# Patient Record
Sex: FEMALE | Race: WHITE
[De-identification: names, ages, dates, MRNs, and addresses within clinical notes are randomized per-mention and may not be internally consistent; named-entity substitution may affect disease eponyms.]

---

## 2018-03-13 ENCOUNTER — HOSPITAL ENCOUNTER (OUTPATIENT)
Dept: HOSPITAL 5 - US | Age: 43
Discharge: HOME | End: 2018-03-13
Attending: FAMILY MEDICINE
Payer: COMMERCIAL

## 2018-03-13 DIAGNOSIS — R10.2: ICD-10-CM

## 2018-03-13 DIAGNOSIS — N88.8: ICD-10-CM

## 2018-03-13 DIAGNOSIS — Z12.31: Primary | ICD-10-CM

## 2018-03-13 PROCEDURE — 77067 SCR MAMMO BI INCL CAD: CPT

## 2018-03-13 PROCEDURE — 76830 TRANSVAGINAL US NON-OB: CPT

## 2018-03-13 PROCEDURE — 93975 VASCULAR STUDY: CPT

## 2018-03-13 NOTE — ULTRASOUND REPORT
TRANSABDOMINAL AND TRANSVAGINAL PELVIC ULTRASOUND: 03/13/18 10:57:00



CLINICAL: Pelvic pain for one year.







FINDINGS: Transabdominal and transvaginal pelvic ultrasound 

demonstrated a retroverted uterus measuring 11.5 x 3.2 x 5.0 cm.  The 

uterus is stretched and compressed by a very full urinary bladder, the 

measurement.  Normal uterine echogenicity with no uterine fibroid or 

mass identified.The endometrium is normal and measures 7.6 mm AP 

thickness.



A complex cyst of the right ovary measures 1.5 cm.  The right ovary 

measures 3.1 x 4.2 x 2.4 cm.  Multiple small follicles of the right 

ovary.  Normal left ovary with small follicles.  The left ovary 

measures 2.8 x 1.6 x 2.7 cm. 



No adnexal mass.  No free fluid. Normal urinary bladder.



IMPRESSION: 1.  Normal uterus and endometrium.  2.  A 1.5 cm complex 

and probably hemorrhagic cyst of the right ovary.  3.  Normal left 

ovary.

## 2018-03-14 NOTE — MAMMOGRAPHY REPORT
BILATERAL DIGITAL SCREENING MAMMOGRAM with CAD: 03/13/18



CLINICAL: Routine screening.



COMPARISON:None available.  However, a prior mammogram was apparently 

done at [facility].



FINDINGS: The breasts are heterogeneously dense, which may obscure 

small masses.  A right asymmetry with architectural distortion on the 

CC view requires comparison with a prior mammogram or additional 

imaging.No suspicious calcifications.The left breast is negative.



IMPRESSION: Right asymmetry and architectural distortion requiring 

further evaluation.



BI-RADS CATEGORY:  0 -- Additional Evaluation Required



RECOMMENDATION: Comparison with a previous mammogram.



We will attempt to obtain a prior mammogram for comparison.  If we do 

not obtain a prior mammogram  within 30 days, a revised report will be 

issued recommending a recall for additional imaging. Please be advised 

that the patient should not schedule an appointment for return until 

adequate time (at least 2 weeks) has passed for us to obtain the prior 

mammogram.



ACR BI-RADS MAMMOGRAPHIC CODES:

0 = Needs additional imaging evaluation; 1 = Negative; 2 = Benign; 3 = 

Probably benign; 4 = Suspicious; 5 = Malignant; 6 = Known biopsy-proven 

malignancy



COMMENT:

      1.   Dense breast tissue, i.e., adenosis, fibrocystic 

            changes, etc., may obscure an underlying neoplasm.

      2.   Approximately 10% of cancers are not detected with

            mammography.

      3.   A negative mammography report should not delay biopsy 

            if a clinically suspicious mass is present.



COMMENT:

Patient follow-up letters are generated via our [employer] application.